# Patient Record
Sex: MALE | Race: WHITE | NOT HISPANIC OR LATINO | URBAN - METROPOLITAN AREA
[De-identification: names, ages, dates, MRNs, and addresses within clinical notes are randomized per-mention and may not be internally consistent; named-entity substitution may affect disease eponyms.]

---

## 2020-09-15 ENCOUNTER — OFFICE VISIT (OUTPATIENT)
Dept: URBAN - METROPOLITAN AREA CLINIC 68 | Facility: CLINIC | Age: 70
End: 2020-09-15

## 2020-09-18 ENCOUNTER — OFFICE VISIT (OUTPATIENT)
Dept: URBAN - METROPOLITAN AREA CLINIC 68 | Facility: CLINIC | Age: 70
End: 2020-09-18

## 2020-10-07 ENCOUNTER — OFFICE VISIT (OUTPATIENT)
Dept: URBAN - METROPOLITAN AREA SURGERY CENTER 12 | Facility: SURGERY CENTER | Age: 70
End: 2020-10-07

## 2020-10-08 ENCOUNTER — TELEPHONE ENCOUNTER (OUTPATIENT)
Dept: URBAN - METROPOLITAN AREA CLINIC 68 | Facility: CLINIC | Age: 70
End: 2020-10-08

## 2020-10-08 ENCOUNTER — LAB OUTSIDE AN ENCOUNTER (OUTPATIENT)
Dept: URBAN - METROPOLITAN AREA CLINIC 68 | Facility: CLINIC | Age: 70
End: 2020-10-08

## 2020-10-08 LAB
01: (no result)
01: (no result)

## 2020-10-22 ENCOUNTER — OFFICE VISIT (OUTPATIENT)
Dept: URBAN - METROPOLITAN AREA CLINIC 68 | Facility: CLINIC | Age: 70
End: 2020-10-22

## 2020-11-02 ENCOUNTER — TELEPHONE ENCOUNTER (OUTPATIENT)
Dept: URBAN - METROPOLITAN AREA CLINIC 68 | Facility: CLINIC | Age: 70
End: 2020-11-02

## 2020-11-12 ENCOUNTER — OFFICE VISIT (OUTPATIENT)
Dept: URBAN - METROPOLITAN AREA CLINIC 68 | Facility: CLINIC | Age: 70
End: 2020-11-12

## 2020-11-19 ENCOUNTER — TELEPHONE ENCOUNTER (OUTPATIENT)
Dept: URBAN - METROPOLITAN AREA CLINIC 68 | Facility: CLINIC | Age: 70
End: 2020-11-19

## 2020-12-01 ENCOUNTER — OFFICE VISIT (OUTPATIENT)
Dept: URBAN - METROPOLITAN AREA CLINIC 68 | Facility: CLINIC | Age: 70
End: 2020-12-01

## 2020-12-29 ENCOUNTER — TELEPHONE ENCOUNTER (OUTPATIENT)
Dept: URBAN - METROPOLITAN AREA CLINIC 68 | Facility: CLINIC | Age: 70
End: 2020-12-29

## 2022-06-04 ENCOUNTER — TELEPHONE ENCOUNTER (OUTPATIENT)
Dept: URBAN - METROPOLITAN AREA CLINIC 68 | Facility: CLINIC | Age: 72
End: 2022-06-04

## 2022-06-04 RX ORDER — RIFAXIMIN 550 MG/1
TABLET ORAL
Qty: 42 | Refills: 0 | OUTPATIENT
Start: 2020-12-01 | End: 2020-12-15

## 2022-06-04 RX ORDER — DICYCLOMINE HYDROCHLORIDE 10 MG/1
CAPSULE ORAL
Qty: 60 | Refills: 0 | OUTPATIENT
Start: 2020-10-22 | End: 2020-11-21

## 2022-06-04 RX ORDER — SODIUM SULFATE, POTASSIUM SULFATE, MAGNESIUM SULFATE 17.5; 3.13; 1.6 G/ML; G/ML; G/ML
SOLUTION, CONCENTRATE ORAL AS DIRECTED
Qty: 1 | Refills: 0 | OUTPATIENT
Start: 2020-09-18 | End: 2020-09-19

## 2022-06-05 ENCOUNTER — TELEPHONE ENCOUNTER (OUTPATIENT)
Dept: URBAN - METROPOLITAN AREA CLINIC 68 | Facility: CLINIC | Age: 72
End: 2022-06-05

## 2022-06-05 RX ORDER — ROSUVASTATIN CALCIUM 10 MG
CRESTOR( 10MG ORAL  DAILY ) ACTIVE -HX ENTRY TABLET ORAL DAILY
Status: ACTIVE | COMMUNITY
Start: 2020-12-01

## 2022-06-05 RX ORDER — TAMSULOSIN HYDROCHLORIDE 0.4 MG/1
FLOMAX( 0.4MG ORAL  DAILY ) ACTIVE -HX ENTRY CAPSULE ORAL DAILY
Status: ACTIVE | COMMUNITY
Start: 2020-12-01

## 2022-06-05 RX ORDER — LOSARTAN POTASSIUM 25 MG/1
LOSARTAN POTASSIUM( 25MG ORAL  DAILY ) ACTIVE -HX ENTRY TABLET ORAL DAILY
Status: ACTIVE | COMMUNITY
Start: 2020-12-01

## 2022-06-25 ENCOUNTER — TELEPHONE ENCOUNTER (OUTPATIENT)
Age: 72
End: 2022-06-25

## 2022-06-25 RX ORDER — SODIUM SULFATE, POTASSIUM SULFATE, MAGNESIUM SULFATE 17.5; 3.13; 1.6 G/ML; G/ML; G/ML
SOLUTION, CONCENTRATE ORAL AS DIRECTED
Qty: 1 | Refills: 0 | OUTPATIENT
Start: 2020-09-18 | End: 2020-09-19

## 2022-06-25 RX ORDER — RIFAXIMIN 550 MG/1
TABLET ORAL
Qty: 42 | Refills: 0 | OUTPATIENT
Start: 2020-12-01 | End: 2020-12-15

## 2022-06-25 RX ORDER — DICYCLOMINE HYDROCHLORIDE 10 MG/1
CAPSULE ORAL
Qty: 60 | Refills: 0 | OUTPATIENT
Start: 2020-10-22 | End: 2020-11-21

## 2022-06-26 ENCOUNTER — TELEPHONE ENCOUNTER (OUTPATIENT)
Age: 72
End: 2022-06-26

## 2022-06-26 RX ORDER — TAMSULOSIN HCL 0.4 MG
FLOMAX( 0.4MG ORAL  DAILY ) ACTIVE -HX ENTRY CAPSULE ORAL DAILY
Status: ACTIVE | COMMUNITY
Start: 2020-12-01

## 2022-06-26 RX ORDER — LOSARTAN POTASSIUM 25 MG/1
LOSARTAN POTASSIUM( 25MG ORAL  DAILY ) ACTIVE -HX ENTRY TABLET, FILM COATED ORAL DAILY
Status: ACTIVE | COMMUNITY
Start: 2020-12-01

## 2022-06-26 RX ORDER — ROSUVASTATIN CALCIUM 10 MG
CRESTOR( 10MG ORAL  DAILY ) ACTIVE -HX ENTRY TABLET ORAL DAILY
Status: ACTIVE | COMMUNITY
Start: 2020-12-01

## 2023-09-06 ENCOUNTER — OFFICE VISIT (OUTPATIENT)
Dept: URBAN - METROPOLITAN AREA CLINIC 68 | Facility: CLINIC | Age: 73
End: 2023-09-06
Payer: MEDICARE

## 2023-09-06 ENCOUNTER — WEB ENCOUNTER (OUTPATIENT)
Dept: URBAN - METROPOLITAN AREA CLINIC 68 | Facility: CLINIC | Age: 73
End: 2023-09-06

## 2023-09-06 VITALS
DIASTOLIC BLOOD PRESSURE: 80 MMHG | BODY MASS INDEX: 24.94 KG/M2 | SYSTOLIC BLOOD PRESSURE: 132 MMHG | HEIGHT: 60 IN | WEIGHT: 127 LBS

## 2023-09-06 DIAGNOSIS — K52.9 COLITIS: ICD-10-CM

## 2023-09-06 PROCEDURE — 99214 OFFICE O/P EST MOD 30 MIN: CPT | Performed by: INTERNAL MEDICINE

## 2023-09-06 RX ORDER — AMOXICILLIN AND CLAVULANATE POTASSIUM 875; 125 MG/1; MG/1
TAKE 1 TABLET BY MOUTH EVERY 12 HOURS FOR 7 DAYS TABLET, FILM COATED ORAL
Qty: 14 EACH | Refills: 0 | Status: ACTIVE | COMMUNITY

## 2023-09-06 RX ORDER — TAMSULOSIN HCL 0.4 MG
FLOMAX( 0.4MG ORAL  DAILY ) ACTIVE -HX ENTRY CAPSULE ORAL DAILY
Status: ACTIVE | COMMUNITY
Start: 2020-12-01

## 2023-09-06 RX ORDER — ROSUVASTATIN CALCIUM 10 MG
CRESTOR( 10MG ORAL  DAILY ) ACTIVE -HX ENTRY TABLET ORAL DAILY
Status: ACTIVE | COMMUNITY
Start: 2020-12-01

## 2023-09-06 RX ORDER — LOSARTAN POTASSIUM 25 MG/1
LOSARTAN POTASSIUM( 25MG ORAL  DAILY ) ACTIVE -HX ENTRY TABLET, FILM COATED ORAL DAILY
Status: ACTIVE | COMMUNITY
Start: 2020-12-01

## 2023-09-06 RX ORDER — SOD SULF/POT CHLORIDE/MAG SULF 1.479 G
12 TABLETS TABLET ORAL
Qty: 24 | Refills: 0 | OUTPATIENT
Start: 2023-09-06 | End: 2023-09-07

## 2023-09-06 RX ORDER — ONDANSETRON 4 MG/1
TABLET, ORALLY DISINTEGRATING ORAL
Qty: 9 UNSPECIFIED | Status: ACTIVE | COMMUNITY

## 2023-09-06 NOTE — HPI-TODAY'S VISIT:
Patient evaluated due to changes in bowel habbits. Referred that aproximatelly 4 weeks ago started with diarrhea , having aproximatelly 5-7 Bms per daily.  Had recent evaluation at [facility] and had negative stool work up and had CT scan reported with colitis, was treated with oral antibiotics but continues with diarrhea.  denied nausea, vomiting, gi bleeding signs.

## 2023-09-11 ENCOUNTER — WEB ENCOUNTER (OUTPATIENT)
Dept: URBAN - METROPOLITAN AREA SURGERY CENTER 12 | Facility: SURGERY CENTER | Age: 73
End: 2023-09-11

## 2023-09-11 ENCOUNTER — CLAIMS CREATED FROM THE CLAIM WINDOW (OUTPATIENT)
Dept: URBAN - METROPOLITAN AREA CLINIC 4 | Facility: CLINIC | Age: 73
End: 2023-09-11
Payer: MEDICARE

## 2023-09-11 ENCOUNTER — OUT OF OFFICE VISIT (OUTPATIENT)
Dept: URBAN - METROPOLITAN AREA SURGERY CENTER 12 | Facility: SURGERY CENTER | Age: 73
End: 2023-09-11
Payer: MEDICARE

## 2023-09-11 DIAGNOSIS — K52.9 COLITIS: ICD-10-CM

## 2023-09-11 DIAGNOSIS — K64.8 OTHER HEMORRHOIDS: ICD-10-CM

## 2023-09-11 DIAGNOSIS — K52.9 NONINFECTIOUS GASTROENTERITIS, UNSPECIFIED TYPE: ICD-10-CM

## 2023-09-11 DIAGNOSIS — K51.919 ULCERATIVE COLITIS, UNSPECIFIED WITH UNSPECIFIED COMPLICATIONS: ICD-10-CM

## 2023-09-11 PROCEDURE — 45380 COLONOSCOPY AND BIOPSY: CPT | Performed by: CLINIC/CENTER

## 2023-09-11 PROCEDURE — 88305 TISSUE EXAM BY PATHOLOGIST: CPT | Performed by: PATHOLOGY

## 2023-09-11 PROCEDURE — 45380 COLONOSCOPY AND BIOPSY: CPT | Performed by: INTERNAL MEDICINE

## 2023-09-11 PROCEDURE — 88342 IMHCHEM/IMCYTCHM 1ST ANTB: CPT | Performed by: PATHOLOGY

## 2023-09-11 PROCEDURE — 00811 ANES LWR INTST NDSC NOS: CPT | Performed by: NURSE ANESTHETIST, CERTIFIED REGISTERED

## 2023-09-11 RX ORDER — ROSUVASTATIN CALCIUM 10 MG
CRESTOR( 10MG ORAL  DAILY ) ACTIVE -HX ENTRY TABLET ORAL DAILY
Status: ACTIVE | COMMUNITY
Start: 2020-12-01

## 2023-09-11 RX ORDER — TAMSULOSIN HCL 0.4 MG
FLOMAX( 0.4MG ORAL  DAILY ) ACTIVE -HX ENTRY CAPSULE ORAL DAILY
Status: ACTIVE | COMMUNITY
Start: 2020-12-01

## 2023-09-11 RX ORDER — AMOXICILLIN AND CLAVULANATE POTASSIUM 875; 125 MG/1; MG/1
TAKE 1 TABLET BY MOUTH EVERY 12 HOURS FOR 7 DAYS TABLET, FILM COATED ORAL
Qty: 14 EACH | Refills: 0 | Status: ACTIVE | COMMUNITY

## 2023-09-11 RX ORDER — ONDANSETRON 4 MG/1
TABLET, ORALLY DISINTEGRATING ORAL
Qty: 9 UNSPECIFIED | Status: ACTIVE | COMMUNITY

## 2023-09-11 RX ORDER — LOSARTAN POTASSIUM 25 MG/1
LOSARTAN POTASSIUM( 25MG ORAL  DAILY ) ACTIVE -HX ENTRY TABLET, FILM COATED ORAL DAILY
Status: ACTIVE | COMMUNITY
Start: 2020-12-01

## 2023-09-13 ENCOUNTER — LAB OUTSIDE AN ENCOUNTER (OUTPATIENT)
Dept: URBAN - METROPOLITAN AREA CLINIC 68 | Facility: CLINIC | Age: 73
End: 2023-09-13

## 2023-09-18 ENCOUNTER — TELEPHONE ENCOUNTER (OUTPATIENT)
Dept: URBAN - METROPOLITAN AREA CLINIC 68 | Facility: CLINIC | Age: 73
End: 2023-09-18

## 2023-09-19 ENCOUNTER — TELEPHONE ENCOUNTER (OUTPATIENT)
Dept: URBAN - METROPOLITAN AREA CLINIC 68 | Facility: CLINIC | Age: 73
End: 2023-09-19

## 2023-09-19 ENCOUNTER — OFFICE VISIT (OUTPATIENT)
Dept: URBAN - METROPOLITAN AREA CLINIC 66 | Facility: CLINIC | Age: 73
End: 2023-09-19
Payer: MEDICARE

## 2023-09-19 VITALS
HEIGHT: 60 IN | DIASTOLIC BLOOD PRESSURE: 82 MMHG | WEIGHT: 127 LBS | SYSTOLIC BLOOD PRESSURE: 128 MMHG | BODY MASS INDEX: 24.94 KG/M2

## 2023-09-19 DIAGNOSIS — Z12.11 SCREENING FOR COLON CANCER: ICD-10-CM

## 2023-09-19 DIAGNOSIS — K51.90 ULCERATIVE COLITIS: ICD-10-CM

## 2023-09-19 DIAGNOSIS — K64.9 HEMORRHOIDS, UNSPECIFIED HEMORRHOID TYPE: ICD-10-CM

## 2023-09-19 PROBLEM — 64226004: Status: ACTIVE | Noted: 2023-09-06

## 2023-09-19 PROCEDURE — 99214 OFFICE O/P EST MOD 30 MIN: CPT

## 2023-09-19 RX ORDER — TAMSULOSIN HCL 0.4 MG
FLOMAX( 0.4MG ORAL  DAILY ) ACTIVE -HX ENTRY CAPSULE ORAL DAILY
Status: ACTIVE | COMMUNITY
Start: 2020-12-01

## 2023-09-19 RX ORDER — LOSARTAN POTASSIUM 25 MG/1
LOSARTAN POTASSIUM( 25MG ORAL  DAILY ) ACTIVE -HX ENTRY TABLET, FILM COATED ORAL DAILY
Status: ACTIVE | COMMUNITY
Start: 2020-12-01

## 2023-09-19 RX ORDER — ROSUVASTATIN CALCIUM 10 MG
CRESTOR( 10MG ORAL  DAILY ) ACTIVE -HX ENTRY TABLET ORAL DAILY
Status: ACTIVE | COMMUNITY
Start: 2020-12-01

## 2023-09-19 RX ORDER — AMOXICILLIN AND CLAVULANATE POTASSIUM 875; 125 MG/1; MG/1
TAKE 1 TABLET BY MOUTH EVERY 12 HOURS FOR 7 DAYS TABLET, FILM COATED ORAL
Qty: 14 EACH | Refills: 0 | Status: ACTIVE | COMMUNITY

## 2023-09-19 RX ORDER — PREDNISONE 10 MG/1
3 TABLETS TABLET ORAL ONCE A DAY
Qty: 90 TABLET | Refills: 0 | OUTPATIENT
Start: 2023-09-19 | End: 2023-10-19

## 2023-09-19 RX ORDER — ONDANSETRON 4 MG/1
TABLET, ORALLY DISINTEGRATING ORAL
Qty: 9 UNSPECIFIED | Status: ACTIVE | COMMUNITY

## 2023-09-19 NOTE — HPI-TODAY'S VISIT:
Patient presents for recent pathology results suggestive of Ulcerative Colitis. Refers continues with diarrhea. Having 6-10 bm's per day with occasional nocturnal bm. He denies nausea/vomiting, dysphagia, odynophagia, abdominal pain, melena, rectal bleeding, weight loss, fever.

## 2023-09-27 ENCOUNTER — LAB OUTSIDE AN ENCOUNTER (OUTPATIENT)
Dept: URBAN - METROPOLITAN AREA CLINIC 68 | Facility: CLINIC | Age: 73
End: 2023-09-27

## 2023-10-02 LAB
A/G RATIO: 1.9
ALBUMIN: 4
ALKALINE PHOSPHATASE: 51
ALT (SGPT): 11
AST (SGOT): 18
BILIRUBIN, TOTAL: 0.9
BUN/CREATININE RATIO: (no result)
BUN: 13
C-REACTIVE PROTEIN, QUANT: <0.2
CALCIUM: 9.3
CARBON DIOXIDE, TOTAL: 24
CHLORIDE: 97
CREATININE: 0.79
EGFR: 95
GLOBULIN, TOTAL: 2.1
GLUCOSE: 73
HEMATOCRIT: 46.7
HEMOGLOBIN: 15.9
HIV AG/AB, 4TH GEN: (no result)
MCH: 30.7
MCHC: 34
MCV: 90.2
MITOGEN-NIL: 0.96
MPV: 10.1
PLATELET COUNT: 220
POTASSIUM: 4.2
PROTEIN, TOTAL: 6.1
QUANTIFERON NIL VALUE: 0.01
QUANTIFERON TB1 AG VALUE: 0
QUANTIFERON TB2 AG VALUE: 0
QUANTIFERON-TB GOLD PLUS: NEGATIVE
RAM: (no result)
RDW: 14
RED BLOOD CELL COUNT: 5.18
SED RATE BY MODIFIED: 2
SODIUM: 132
WHITE BLOOD CELL COUNT: 14.3

## 2023-10-03 ENCOUNTER — OFFICE VISIT (OUTPATIENT)
Dept: URBAN - METROPOLITAN AREA CLINIC 66 | Facility: CLINIC | Age: 73
End: 2023-10-03
Payer: MEDICARE

## 2023-10-03 VITALS
WEIGHT: 127 LBS | DIASTOLIC BLOOD PRESSURE: 78 MMHG | HEIGHT: 60 IN | HEART RATE: 78 BPM | SYSTOLIC BLOOD PRESSURE: 124 MMHG | BODY MASS INDEX: 24.94 KG/M2 | OXYGEN SATURATION: 96 %

## 2023-10-03 DIAGNOSIS — Z12.11 SCREENING FOR COLON CANCER: ICD-10-CM

## 2023-10-03 DIAGNOSIS — K51.90 ULCERATIVE COLITIS: ICD-10-CM

## 2023-10-03 DIAGNOSIS — K64.9 HEMORRHOIDS, UNSPECIFIED HEMORRHOID TYPE: ICD-10-CM

## 2023-10-03 PROCEDURE — 99214 OFFICE O/P EST MOD 30 MIN: CPT | Performed by: INTERNAL MEDICINE

## 2023-10-03 RX ORDER — LOSARTAN POTASSIUM 25 MG/1
LOSARTAN POTASSIUM( 25MG ORAL  DAILY ) ACTIVE -HX ENTRY TABLET, FILM COATED ORAL DAILY
Status: ACTIVE | COMMUNITY
Start: 2020-12-01

## 2023-10-03 RX ORDER — ONDANSETRON 4 MG/1
TABLET, ORALLY DISINTEGRATING ORAL
Qty: 9 UNSPECIFIED | Status: ACTIVE | COMMUNITY

## 2023-10-03 RX ORDER — PREDNISONE 10 MG/1
3 TABLETS TABLET ORAL ONCE A DAY
Qty: 270 TABLET | Refills: 0
Start: 2023-09-19 | End: 2023-11-02

## 2023-10-03 RX ORDER — TAMSULOSIN HCL 0.4 MG
FLOMAX( 0.4MG ORAL  DAILY ) ACTIVE -HX ENTRY CAPSULE ORAL DAILY
Status: ACTIVE | COMMUNITY
Start: 2020-12-01

## 2023-10-03 RX ORDER — AMOXICILLIN AND CLAVULANATE POTASSIUM 875; 125 MG/1; MG/1
TAKE 1 TABLET BY MOUTH EVERY 12 HOURS FOR 7 DAYS TABLET, FILM COATED ORAL
Qty: 14 EACH | Refills: 0 | Status: ACTIVE | COMMUNITY

## 2023-10-03 RX ORDER — PREDNISONE 10 MG/1
3 TABLETS TABLET ORAL ONCE A DAY
Qty: 90 TABLET | Refills: 0 | Status: ACTIVE | COMMUNITY
Start: 2023-09-19 | End: 2023-10-19

## 2023-10-03 RX ORDER — ROSUVASTATIN CALCIUM 10 MG
CRESTOR( 10MG ORAL  DAILY ) ACTIVE -HX ENTRY TABLET ORAL DAILY
Status: ACTIVE | COMMUNITY
Start: 2020-12-01

## 2023-10-03 NOTE — HPI-TODAY'S VISIT:
Patient evaluated after having recent colonoscopy found with ulcerative colitis.  On his last evaluation was started on prednisone and blood work up was ordered.  Today referred that is currently on prednisone 30mg . Referred is 80% better since started prednisone.  Denies nausea, vomits, dysphagia, odynophagia, heartburn, abdominal pain, diarrhea, constipation GI bleeding or weight loss

## 2023-10-24 ENCOUNTER — OFFICE VISIT (OUTPATIENT)
Dept: URBAN - METROPOLITAN AREA CLINIC 66 | Facility: CLINIC | Age: 73
End: 2023-10-24
Payer: MEDICARE

## 2023-10-24 VITALS
DIASTOLIC BLOOD PRESSURE: 82 MMHG | HEART RATE: 83 BPM | HEIGHT: 60 IN | OXYGEN SATURATION: 98 % | SYSTOLIC BLOOD PRESSURE: 140 MMHG | WEIGHT: 127 LBS | BODY MASS INDEX: 24.94 KG/M2

## 2023-10-24 DIAGNOSIS — K51.90 ULCERATIVE COLITIS: ICD-10-CM

## 2023-10-24 DIAGNOSIS — K58.0 IRRITABLE BOWEL SYNDROME WITH DIARRHEA: ICD-10-CM

## 2023-10-24 PROCEDURE — 99214 OFFICE O/P EST MOD 30 MIN: CPT | Performed by: INTERNAL MEDICINE

## 2023-10-24 RX ORDER — AMOXICILLIN AND CLAVULANATE POTASSIUM 875; 125 MG/1; MG/1
TAKE 1 TABLET BY MOUTH EVERY 12 HOURS FOR 7 DAYS TABLET, FILM COATED ORAL
Qty: 14 EACH | Refills: 0 | Status: ACTIVE | COMMUNITY

## 2023-10-24 RX ORDER — ONDANSETRON 4 MG/1
TABLET, ORALLY DISINTEGRATING ORAL
Qty: 9 UNSPECIFIED | Status: ACTIVE | COMMUNITY

## 2023-10-24 RX ORDER — TAMSULOSIN HCL 0.4 MG
FLOMAX( 0.4MG ORAL  DAILY ) ACTIVE -HX ENTRY CAPSULE ORAL DAILY
Status: ACTIVE | COMMUNITY
Start: 2020-12-01

## 2023-10-24 RX ORDER — ROSUVASTATIN CALCIUM 10 MG
CRESTOR( 10MG ORAL  DAILY ) ACTIVE -HX ENTRY TABLET ORAL DAILY
Status: ACTIVE | COMMUNITY
Start: 2020-12-01

## 2023-10-24 RX ORDER — PREDNISONE 10 MG/1
3 TABLETS TABLET ORAL ONCE A DAY
Qty: 270 TABLET | Refills: 0 | Status: ACTIVE | COMMUNITY
Start: 2023-09-19 | End: 2023-11-02

## 2023-10-24 RX ORDER — LOSARTAN POTASSIUM 25 MG/1
LOSARTAN POTASSIUM( 25MG ORAL  DAILY ) ACTIVE -HX ENTRY TABLET, FILM COATED ORAL DAILY
Status: ACTIVE | COMMUNITY
Start: 2020-12-01

## 2023-10-24 NOTE — HPI-TODAY'S VISIT:
Patient evaluated due to ulcerative colitis.  Today referred that is feeling good, currently on 30mg daily.  Denies nausea, vomits, dysphagia, odynophagia, heartburn, abdominal pain, diarrhea, constipation GI bleeding or weight loss

## 2023-10-31 ENCOUNTER — TELEPHONE ENCOUNTER (OUTPATIENT)
Dept: URBAN - METROPOLITAN AREA CLINIC 66 | Facility: CLINIC | Age: 73
End: 2023-10-31

## 2023-10-31 RX ORDER — BALSALAZIDE DISODIUM 750 MG/1
2 CAPSULES CAPSULE ORAL THREE TIMES A DAY
Qty: 180 CAPSULE | Refills: 1 | OUTPATIENT
Start: 2023-11-03 | End: 2024-01-01

## 2023-11-21 ENCOUNTER — OFFICE VISIT (OUTPATIENT)
Dept: URBAN - METROPOLITAN AREA CLINIC 66 | Facility: CLINIC | Age: 73
End: 2023-11-21

## 2023-11-28 ENCOUNTER — OFFICE VISIT (OUTPATIENT)
Dept: URBAN - METROPOLITAN AREA CLINIC 66 | Facility: CLINIC | Age: 73
End: 2023-11-28

## 2023-11-29 ENCOUNTER — OFFICE VISIT (OUTPATIENT)
Dept: URBAN - METROPOLITAN AREA TELEHEALTH 1 | Facility: TELEHEALTH | Age: 73
End: 2023-11-29
Payer: MEDICARE

## 2023-11-29 ENCOUNTER — TELEPHONE ENCOUNTER (OUTPATIENT)
Dept: URBAN - METROPOLITAN AREA CLINIC 68 | Facility: CLINIC | Age: 73
End: 2023-11-29

## 2023-11-29 VITALS — HEIGHT: 60 IN | WEIGHT: 127 LBS | BODY MASS INDEX: 24.94 KG/M2

## 2023-11-29 DIAGNOSIS — K64.9 HEMORRHOIDS, UNSPECIFIED HEMORRHOID TYPE: ICD-10-CM

## 2023-11-29 DIAGNOSIS — Z12.11 SCREENING FOR COLON CANCER: ICD-10-CM

## 2023-11-29 DIAGNOSIS — K51.90 ULCERATIVE COLITIS: ICD-10-CM

## 2023-11-29 PROCEDURE — 99214 OFFICE O/P EST MOD 30 MIN: CPT

## 2023-11-29 RX ORDER — BALSALAZIDE DISODIUM 750 MG/1
2 CAPSULES CAPSULE ORAL THREE TIMES A DAY
Qty: 180 CAPSULE | Refills: 1 | Status: ACTIVE | COMMUNITY
Start: 2023-11-03 | End: 2024-01-01

## 2023-11-29 RX ORDER — AMOXICILLIN AND CLAVULANATE POTASSIUM 875; 125 MG/1; MG/1
TAKE 1 TABLET BY MOUTH EVERY 12 HOURS FOR 7 DAYS TABLET, FILM COATED ORAL
Qty: 14 EACH | Refills: 0 | Status: ACTIVE | COMMUNITY

## 2023-11-29 RX ORDER — ROSUVASTATIN CALCIUM 10 MG
CRESTOR( 10MG ORAL  DAILY ) ACTIVE -HX ENTRY TABLET ORAL DAILY
Status: ACTIVE | COMMUNITY
Start: 2020-12-01

## 2023-11-29 RX ORDER — LOSARTAN POTASSIUM 25 MG/1
LOSARTAN POTASSIUM( 25MG ORAL  DAILY ) ACTIVE -HX ENTRY TABLET, FILM COATED ORAL DAILY
Status: ACTIVE | COMMUNITY
Start: 2020-12-01

## 2023-11-29 RX ORDER — TAMSULOSIN HCL 0.4 MG
FLOMAX( 0.4MG ORAL  DAILY ) ACTIVE -HX ENTRY CAPSULE ORAL DAILY
Status: ACTIVE | COMMUNITY
Start: 2020-12-01

## 2023-11-29 RX ORDER — BALSALAZIDE DISODIUM 750 MG/1
2 CAPSULES CAPSULE ORAL THREE TIMES A DAY
Qty: 180 | Refills: 1
Start: 2023-11-03 | End: 2024-01-29

## 2023-11-29 RX ORDER — ONDANSETRON 4 MG/1
TABLET, ORALLY DISINTEGRATING ORAL
Qty: 9 UNSPECIFIED | Status: ACTIVE | COMMUNITY

## 2023-11-29 NOTE — HPI-TODAY'S VISIT:
Patient evaluated via telehealth due to ulcerative colitis.  Today refers he is feeling good, currently on balsalazide 1.5gm tid. Refers discontinued prednisone ten days ago. Denies nausea, vomiting, dysphagia, odynophagia, heartburn, abdominal pain, diarrhea, constipation GI bleeding or weight loss.

## 2023-12-12 ENCOUNTER — OFFICE VISIT (OUTPATIENT)
Dept: URBAN - METROPOLITAN AREA CLINIC 66 | Facility: CLINIC | Age: 73
End: 2023-12-12
Payer: MEDICARE

## 2023-12-12 ENCOUNTER — TELEPHONE ENCOUNTER (OUTPATIENT)
Dept: URBAN - METROPOLITAN AREA CLINIC 66 | Facility: CLINIC | Age: 73
End: 2023-12-12

## 2023-12-12 ENCOUNTER — OFFICE VISIT (OUTPATIENT)
Dept: URBAN - METROPOLITAN AREA CLINIC 66 | Facility: CLINIC | Age: 73
End: 2023-12-12

## 2023-12-12 VITALS — HEIGHT: 60 IN | BODY MASS INDEX: 24.94 KG/M2 | WEIGHT: 127 LBS

## 2023-12-12 DIAGNOSIS — K51.90 ULCERATIVE COLITIS: ICD-10-CM

## 2023-12-12 DIAGNOSIS — K58.0 IRRITABLE BOWEL SYNDROME WITH DIARRHEA: ICD-10-CM

## 2023-12-12 PROCEDURE — 99214 OFFICE O/P EST MOD 30 MIN: CPT | Performed by: INTERNAL MEDICINE

## 2023-12-12 RX ORDER — ONDANSETRON 4 MG/1
TABLET, ORALLY DISINTEGRATING ORAL
Qty: 9 UNSPECIFIED | Status: ACTIVE | COMMUNITY

## 2023-12-12 RX ORDER — ADALIMUMAB 40MG/0.4ML
0.4 ML KIT SUBCUTANEOUS
Qty: 8 | Refills: 3 | OUTPATIENT
Start: 2023-12-12 | End: 2024-12-06

## 2023-12-12 RX ORDER — BALSALAZIDE DISODIUM 750 MG/1
2 CAPSULES CAPSULE ORAL THREE TIMES A DAY
Qty: 180 | Refills: 1 | Status: ACTIVE | COMMUNITY
Start: 2023-11-03 | End: 2024-01-29

## 2023-12-12 RX ORDER — ROSUVASTATIN CALCIUM 10 MG
CRESTOR( 10MG ORAL  DAILY ) ACTIVE -HX ENTRY TABLET ORAL DAILY
Status: ACTIVE | COMMUNITY
Start: 2020-12-01

## 2023-12-12 RX ORDER — AMOXICILLIN AND CLAVULANATE POTASSIUM 875; 125 MG/1; MG/1
TAKE 1 TABLET BY MOUTH EVERY 12 HOURS FOR 7 DAYS TABLET, FILM COATED ORAL
Qty: 14 EACH | Refills: 0 | Status: ACTIVE | COMMUNITY

## 2023-12-12 RX ORDER — TAMSULOSIN HCL 0.4 MG
FLOMAX( 0.4MG ORAL  DAILY ) ACTIVE -HX ENTRY CAPSULE ORAL DAILY
Status: ACTIVE | COMMUNITY
Start: 2020-12-01

## 2023-12-12 RX ORDER — LOSARTAN POTASSIUM 25 MG/1
LOSARTAN POTASSIUM( 25MG ORAL  DAILY ) ACTIVE -HX ENTRY TABLET, FILM COATED ORAL DAILY
Status: ACTIVE | COMMUNITY
Start: 2020-12-01

## 2023-12-12 RX ORDER — PREDNISONE 10 MG/1
2 TABLET TABLET ORAL ONCE A DAY
Qty: 180 TABLET | OUTPATIENT
Start: 2023-12-12 | End: 2024-03-11

## 2023-12-12 NOTE — HPI-TODAY'S VISIT:
Steve evaluated due to Ulcerative colitis. Referred compleated treatment with prednisone and started balzalazide but started with diarrhea again. Referred having arthritis symptoms.  Referred having 3-6 Bms per day with intermitent blood.  Denies nausea, vomits, dysphagia, odynophagia, heartburn, abdominal pain,  constipation GI bleeding or weight loss

## 2024-01-11 ENCOUNTER — TELEPHONE ENCOUNTER (OUTPATIENT)
Dept: URBAN - METROPOLITAN AREA CLINIC 68 | Facility: CLINIC | Age: 74
End: 2024-01-11

## 2024-01-11 RX ORDER — ADALIMUMAB 40MG/0.4ML
0.4 ML KIT SUBCUTANEOUS EVERY 2 WEEKS
Qty: 6 KIT | Refills: 5
Start: 2023-12-12 | End: 2024-04-05

## 2024-01-23 ENCOUNTER — OFFICE VISIT (OUTPATIENT)
Dept: URBAN - METROPOLITAN AREA CLINIC 66 | Facility: CLINIC | Age: 74
End: 2024-01-23
Payer: MEDICARE

## 2024-01-23 VITALS
HEIGHT: 60 IN | DIASTOLIC BLOOD PRESSURE: 80 MMHG | SYSTOLIC BLOOD PRESSURE: 140 MMHG | BODY MASS INDEX: 24.94 KG/M2 | WEIGHT: 127 LBS

## 2024-01-23 DIAGNOSIS — K51.90 ULCERATIVE COLITIS: ICD-10-CM

## 2024-01-23 DIAGNOSIS — K58.0 IRRITABLE BOWEL SYNDROME WITH DIARRHEA: ICD-10-CM

## 2024-01-23 DIAGNOSIS — K64.9 HEMORRHOIDS, UNSPECIFIED HEMORRHOID TYPE: ICD-10-CM

## 2024-01-23 PROCEDURE — 99214 OFFICE O/P EST MOD 30 MIN: CPT

## 2024-01-23 RX ORDER — AMOXICILLIN AND CLAVULANATE POTASSIUM 875; 125 MG/1; MG/1
TAKE 1 TABLET BY MOUTH EVERY 12 HOURS FOR 7 DAYS TABLET, FILM COATED ORAL
Qty: 14 EACH | Refills: 0 | Status: ON HOLD | COMMUNITY

## 2024-01-23 RX ORDER — ONDANSETRON 4 MG/1
TABLET, ORALLY DISINTEGRATING ORAL
Qty: 9 UNSPECIFIED | Status: ACTIVE | COMMUNITY

## 2024-01-23 RX ORDER — BALSALAZIDE DISODIUM 750 MG/1
2 CAPSULES CAPSULE ORAL THREE TIMES A DAY
Qty: 180 | Refills: 1 | Status: ON HOLD | COMMUNITY
Start: 2023-11-03 | End: 2024-01-29

## 2024-01-23 RX ORDER — TAMSULOSIN HCL 0.4 MG
FLOMAX( 0.4MG ORAL  DAILY ) ACTIVE -HX ENTRY CAPSULE ORAL DAILY
Status: ACTIVE | COMMUNITY
Start: 2020-12-01

## 2024-01-23 RX ORDER — PREDNISONE 10 MG/1
2 TABLET TABLET ORAL ONCE A DAY
Qty: 180 TABLET | Status: ACTIVE | COMMUNITY
Start: 2023-12-12 | End: 2024-03-11

## 2024-01-23 RX ORDER — ADALIMUMAB 40MG/0.4ML
0.4 ML KIT SUBCUTANEOUS EVERY 2 WEEKS
Qty: 6 KIT | Refills: 5 | Status: ACTIVE | COMMUNITY
Start: 2023-12-12 | End: 2024-04-05

## 2024-01-23 RX ORDER — ROSUVASTATIN CALCIUM 10 MG
CRESTOR( 10MG ORAL  DAILY ) ACTIVE -HX ENTRY TABLET ORAL DAILY
Status: ACTIVE | COMMUNITY
Start: 2020-12-01

## 2024-01-23 RX ORDER — LOSARTAN POTASSIUM 25 MG/1
LOSARTAN POTASSIUM( 25MG ORAL  DAILY ) ACTIVE -HX ENTRY TABLET, FILM COATED ORAL DAILY
Status: ACTIVE | COMMUNITY
Start: 2020-12-01

## 2024-01-23 NOTE — HPI-TODAY'S VISIT:
Patient evaluated due to Ulcerative colitis. Refers continues on Prednisone 5mg/d. Referred having arthritis symptoms and approval for Humira is pending. Denies nausea, vomiting, dysphagia, odynophagia, heartburn, abdominal pain,  diarrhea, constipation, GI bleeding or weight loss.

## 2024-02-20 ENCOUNTER — OV EP (OUTPATIENT)
Dept: URBAN - METROPOLITAN AREA CLINIC 66 | Facility: CLINIC | Age: 74
End: 2024-02-20

## 2024-03-19 ENCOUNTER — OV EP (OUTPATIENT)
Dept: URBAN - METROPOLITAN AREA CLINIC 66 | Facility: CLINIC | Age: 74
End: 2024-03-19
Payer: MEDICARE

## 2024-03-19 DIAGNOSIS — K51.90 ULCERATIVE COLITIS: ICD-10-CM

## 2024-03-19 DIAGNOSIS — Z12.11 SCREENING FOR COLON CANCER: ICD-10-CM

## 2024-03-19 PROCEDURE — 99213 OFFICE O/P EST LOW 20 MIN: CPT

## 2024-03-19 RX ORDER — AMOXICILLIN AND CLAVULANATE POTASSIUM 875; 125 MG/1; MG/1
TAKE 1 TABLET BY MOUTH EVERY 12 HOURS FOR 7 DAYS TABLET, FILM COATED ORAL
Qty: 14 EACH | Refills: 0 | Status: ON HOLD | COMMUNITY

## 2024-03-19 RX ORDER — LOSARTAN POTASSIUM 25 MG/1
LOSARTAN POTASSIUM( 25MG ORAL  DAILY ) ACTIVE -HX ENTRY TABLET, FILM COATED ORAL DAILY
Status: ACTIVE | COMMUNITY
Start: 2020-12-01

## 2024-03-19 RX ORDER — ADALIMUMAB 40MG/0.4ML
0.4 ML KIT SUBCUTANEOUS EVERY 2 WEEKS
Qty: 6 KIT | Refills: 5 | Status: ACTIVE | COMMUNITY
Start: 2023-12-12 | End: 2024-04-05

## 2024-03-19 RX ORDER — TAMSULOSIN HCL 0.4 MG
FLOMAX( 0.4MG ORAL  DAILY ) ACTIVE -HX ENTRY CAPSULE ORAL DAILY
Status: ACTIVE | COMMUNITY
Start: 2020-12-01

## 2024-03-19 RX ORDER — ROSUVASTATIN CALCIUM 10 MG
CRESTOR( 10MG ORAL  DAILY ) ACTIVE -HX ENTRY TABLET ORAL DAILY
Status: ACTIVE | COMMUNITY
Start: 2020-12-01

## 2024-03-19 RX ORDER — ONDANSETRON 4 MG/1
TABLET, ORALLY DISINTEGRATING ORAL
Qty: 9 UNSPECIFIED | Status: ACTIVE | COMMUNITY

## 2024-03-19 NOTE — HPI-TODAY'S VISIT:
Patient evaluated due to ulcerative colitis. Refers discontinued Prednisone and is due to start Humira injection today. Denies nausea, vomiting, dysphagia, odynophagia, heartburn, abdominal pain,  diarrhea, constipation, GI bleeding or weight loss.

## 2024-10-18 ENCOUNTER — TELEPHONE ENCOUNTER (OUTPATIENT)
Dept: URBAN - METROPOLITAN AREA CLINIC 66 | Facility: CLINIC | Age: 74
End: 2024-10-18

## 2024-10-18 RX ORDER — ADALIMUMAB 40MG/0.4ML
0.4 ML KIT SUBCUTANEOUS EVERY 2 WEEKS
Qty: 6 KIT | Refills: 5
Start: 2023-12-12 | End: 2025-01-10

## 2024-10-29 ENCOUNTER — OFFICE VISIT (OUTPATIENT)
Dept: URBAN - METROPOLITAN AREA CLINIC 66 | Facility: CLINIC | Age: 74
End: 2024-10-29
Payer: MEDICARE

## 2024-10-29 ENCOUNTER — DASHBOARD ENCOUNTERS (OUTPATIENT)
Age: 74
End: 2024-10-29

## 2024-10-29 VITALS
WEIGHT: 128 LBS | BODY MASS INDEX: 25.13 KG/M2 | SYSTOLIC BLOOD PRESSURE: 124 MMHG | DIASTOLIC BLOOD PRESSURE: 76 MMHG | HEART RATE: 80 BPM | OXYGEN SATURATION: 97 % | HEIGHT: 60 IN

## 2024-10-29 DIAGNOSIS — Z12.11 SCREENING FOR COLON CANCER: ICD-10-CM

## 2024-10-29 DIAGNOSIS — K51.90 ULCERATIVE COLITIS: ICD-10-CM

## 2024-10-29 PROCEDURE — 99214 OFFICE O/P EST MOD 30 MIN: CPT

## 2024-10-29 RX ORDER — ROSUVASTATIN CALCIUM 10 MG
CRESTOR( 10MG ORAL  DAILY ) ACTIVE -HX ENTRY TABLET ORAL DAILY
Status: ACTIVE | COMMUNITY
Start: 2020-12-01

## 2024-10-29 RX ORDER — LOSARTAN POTASSIUM 25 MG/1
LOSARTAN POTASSIUM( 25MG ORAL  DAILY ) ACTIVE -HX ENTRY TABLET, FILM COATED ORAL DAILY
Status: ACTIVE | COMMUNITY
Start: 2020-12-01

## 2024-10-29 RX ORDER — ADALIMUMAB 40MG/0.4ML
0.4 ML KIT SUBCUTANEOUS EVERY 2 WEEKS
Qty: 6 KIT | Refills: 5 | Status: ACTIVE | COMMUNITY
Start: 2023-12-12 | End: 2025-01-10

## 2024-10-29 RX ORDER — TAMSULOSIN HCL 0.4 MG
FLOMAX( 0.4MG ORAL  DAILY ) ACTIVE -HX ENTRY CAPSULE ORAL DAILY
Status: ACTIVE | COMMUNITY
Start: 2020-12-01

## 2024-10-29 NOTE — HPI-TODAY'S VISIT:
Patient evaluated due to ulcerative colitis. Refers continues on Humira injection every 2 weeks. Refers having one formed BM daily. Refers struggling with progression of Parkinsons Disease. Denies nausea/vomiting, dysphagia, odynophagia, abdominal pain, melena, rectal bleeding, diarrhea, constipation, weight loss, fever.

## 2024-10-30 ENCOUNTER — TELEPHONE ENCOUNTER (OUTPATIENT)
Dept: URBAN - METROPOLITAN AREA CLINIC 68 | Facility: CLINIC | Age: 74
End: 2024-10-30